# Patient Record
Sex: FEMALE | Race: OTHER | Employment: STUDENT | URBAN - METROPOLITAN AREA
[De-identification: names, ages, dates, MRNs, and addresses within clinical notes are randomized per-mention and may not be internally consistent; named-entity substitution may affect disease eponyms.]

---

## 2017-01-28 ENCOUNTER — TELEPHONE (OUTPATIENT)
Dept: OBGYN CLINIC | Facility: CLINIC | Age: 22
End: 2017-01-28

## 2017-01-28 NOTE — TELEPHONE ENCOUNTER
Pt states that she is taking her birth control pills qd and at the same time every day. Pt has not missed any pills. Pt states that she did not receive a period last month.   Pt states that she is currently on the 2nd week of the real pills and received a

## 2017-01-30 NOTE — TELEPHONE ENCOUNTER
Pt informed of [facility] recs and verbalized understanding. Pt given office fax number ([phone]). Pt stated she does not know the fax number for health services at her college but will call if an order needs to be faxed over to health services.  Pt informed

## 2017-01-30 NOTE — TELEPHONE ENCOUNTER
Pt informed of [facility] recs and verbalized understanding. Pt stated she is away at college right now, she goes to [address] [place] [address].  Message back to LUANNE--how would you like pt to proceed with pregnancy test? Have her go to [place] and have blood preg

## 2017-02-16 ENCOUNTER — TELEPHONE (OUTPATIENT)
Dept: OBGYN CLINIC | Facility: CLINIC | Age: 22
End: 2017-02-16

## 2017-02-16 DIAGNOSIS — Z32.00 PREGNANCY EXAMINATION OR TEST, PREGNANCY UNCONFIRMED: Primary | ICD-10-CM

## 2017-02-16 NOTE — TELEPHONE ENCOUNTER
Pt states she was unable to go to lab in [place] because they would not order a bhcg. Informed pt we will order bhcg and she can go to the lab this weekend when she is in town. Pt is to call Monday, and if negative, we can order new OCP per [facility]'s 1/28 note.  P

## 2017-02-16 NOTE — TELEPHONE ENCOUNTER
PT STATE SHE SPOKE WITH DR STROUD A MONTH AGO / REGARDING CHANGING HER B/C PILLS / PT STATE SHE WILL BE IN TOWN THIS WEEK-END / WANT TO KNOW IF SHE COULD GET IN TO SEE DR STROUD  / PLS ADV

## 2017-02-18 ENCOUNTER — APPOINTMENT (OUTPATIENT)
Dept: LAB | Age: 22
End: 2017-02-18
Attending: OBSTETRICS & GYNECOLOGY
Payer: COMMERCIAL

## 2017-02-18 DIAGNOSIS — Z32.00 PREGNANCY EXAMINATION OR TEST, PREGNANCY UNCONFIRMED: ICD-10-CM

## 2017-02-18 LAB — HCG SERPL QL: NEGATIVE

## 2017-02-18 PROCEDURE — 84703 CHORIONIC GONADOTROPIN ASSAY: CPT

## 2017-02-18 PROCEDURE — 36415 COLL VENOUS BLD VENIPUNCTURE: CPT

## 2017-02-20 RX ORDER — NORETHINDRONE ACETATE AND ETHINYL ESTRADIOL 1.5-30(21)
1 KIT ORAL DAILY
Qty: 1 PACKAGE | Refills: 6 | Status: SHIPPED | OUTPATIENT
Start: 2017-02-20 | End: 2017-06-26

## 2017-02-20 RX ORDER — NORETHINDRONE ACETATE AND ETHINYL ESTRADIOL 1.5-30(21)
1 KIT ORAL DAILY
Qty: 1 PACKAGE | Refills: 6 | Status: SHIPPED | OUTPATIENT
Start: 2017-02-20 | End: 2017-02-20

## 2017-02-20 NOTE — TELEPHONE ENCOUNTER
2-18-16 PREG TEST IS NEGATIVE. RX SENT TO PHARMACY. PT TO RETURN IN [place] FOR ANNUAL. PT NOTIFIED TEST IS NEGATIVE AND TO START OC'S TODAY. PT ASKED THAT RX BE SENT TO HER PHARMACY IN [place]. RX SENT. PT AWARE TO RETURN IN [place].

## 2017-04-05 NOTE — TELEPHONE ENCOUNTER
90 day supply request received from pts [facility] for Junel. Pt due for next annual in 8/2017.  Form completed and faxed back to [facility] for 90 day supply with 1 refill to cover her until august.

## 2017-06-26 ENCOUNTER — TELEPHONE (OUTPATIENT)
Dept: OBGYN CLINIC | Facility: CLINIC | Age: 22
End: 2017-06-26

## 2017-06-26 RX ORDER — NORETHINDRONE ACETATE AND ETHINYL ESTRADIOL 1.5-30(21)
1 KIT ORAL DAILY
Qty: 1 PACKAGE | Refills: 0 | Status: SHIPPED | OUTPATIENT
Start: 2017-06-26 | End: 2017-08-10

## 2017-06-26 NOTE — TELEPHONE ENCOUNTER
Pt is about to get on a plane, pt is giving authorization for the nurse to call mom back [phone].  Explain the ZARINA policy to pt, per pt she needs her rx by today

## 2017-06-26 NOTE — TELEPHONE ENCOUNTER
Per below release spoke with mom. States pt is on her way back from [facility] and lost her BC while away. Requesting a one month supply be sent to local pharmacy. [facility] pt missed one day.   Informed 90 days supply request from pharmacy was approved by our of

## 2017-06-26 NOTE — TELEPHONE ENCOUNTER
PER PT SHE LOST HER B/C NEW PACK DURING VACATION, CAN A NEW RX BE SEND TO [facility] ?

## 2017-08-10 ENCOUNTER — TELEPHONE (OUTPATIENT)
Dept: OBGYN CLINIC | Facility: CLINIC | Age: 22
End: 2017-08-10

## 2017-08-10 RX ORDER — NORETHINDRONE ACETATE AND ETHINYL ESTRADIOL 1.5-30(21)
1 KIT ORAL DAILY
Qty: 3 PACKAGE | Refills: 0 | Status: SHIPPED | OUTPATIENT
Start: 2017-08-10 | End: 2018-03-14

## 2017-08-10 NOTE — TELEPHONE ENCOUNTER
Lm stating 3 month OCP rx refill has been sent to pt's pharmacy to cover pt to next annual appt. Pt to ensure she keeps annual appt to get rx refill. Last annual 8/4/16.

## 2017-08-10 NOTE — TELEPHONE ENCOUNTER
Pt needs a 3mos refill for bc. Pt insurance only cover 3 mos supply. Needs it until px on 9/29.  Pl adv

## 2018-02-27 ENCOUNTER — TELEPHONE (OUTPATIENT)
Dept: OBGYN CLINIC | Facility: CLINIC | Age: 23
End: 2018-02-27

## 2018-02-27 NOTE — TELEPHONE ENCOUNTER
Pt states that she had lost three packages of ocps  and had them refilled by pharmacy. Pt states she the found the packets she lost. She states she used the ocps packets  that she found at home took them when they were needed.   Pt stating that she is due

## 2018-02-27 NOTE — TELEPHONE ENCOUNTER
Matty sent a request for birthcontrol pils for Junel Fe 1.5mg - 30 mcg. Pt has appt scheduled 3/2018. Last Annual 8/4/16 with LUANNE. Pt was due for Annual 8/2017.  (No show 9/29/17.)    Last ocp filled on 8/10/17 for loestrin Fe 1.5/30 for 3

## 2018-03-14 ENCOUNTER — OFFICE VISIT (OUTPATIENT)
Dept: OBGYN CLINIC | Facility: CLINIC | Age: 23
End: 2018-03-14

## 2018-03-14 VITALS
SYSTOLIC BLOOD PRESSURE: 124 MMHG | BODY MASS INDEX: 19.63 KG/M2 | WEIGHT: 115 LBS | HEART RATE: 63 BPM | DIASTOLIC BLOOD PRESSURE: 81 MMHG | HEIGHT: 64 IN

## 2018-03-14 DIAGNOSIS — Z30.09 BIRTH CONTROL COUNSELING: ICD-10-CM

## 2018-03-14 DIAGNOSIS — Z01.419 ENCOUNTER FOR GYNECOLOGICAL EXAMINATION WITHOUT ABNORMAL FINDING: Primary | ICD-10-CM

## 2018-03-14 PROCEDURE — 99395 PREV VISIT EST AGE 18-39: CPT | Performed by: OBSTETRICS & GYNECOLOGY

## 2018-03-14 RX ORDER — NORETHINDRONE ACETATE AND ETHINYL ESTRADIOL 1.5-30(21)
1 KIT ORAL DAILY
Qty: 3 PACKAGE | Refills: 3 | Status: SHIPPED | OUTPATIENT
Start: 2018-03-14 | End: 2019-02-11

## 2018-03-14 NOTE — PROGRESS NOTES
Ernestina Sanchez is a 23year old female  Patient's last menstrual period was 2018. Patient presents with:  Gyn Exam: annual  Contraception: off ocps x 2 months -- wishes to restart -- no issues  .     OBSTETRICS HISTORY:  OB History    P denies chest pain or palpitations  Respiratory:    denies shortness of breath  Gastrointestinal:  denies heartburn, abdominal pain, diarrhea or constipation  Genitourinary:    denies dysuria, incontinence, abnormal vaginal discharge, vaginal itching  Musc gynecological examination without abnormal finding    Birth control counseling    Other orders  -     Norethin Ace-Eth Estrad-FE (LOESTRIN FE 1.5/30) 1.5-30 MG-MCG Oral Tab; Take 1 tablet by mouth daily. Pap next year. Declines STD screen.  ocps refill

## 2018-10-27 ENCOUNTER — NURSE TRIAGE (OUTPATIENT)
Dept: OTHER | Age: 23
End: 2018-10-27

## 2018-10-27 NOTE — TELEPHONE ENCOUNTER
Action Requested: Summary for Provider     []  Critical Lab, Recommendations Needed  [] Need Additional Advice  []   FYI    []   Need Orders  [] Need Medications Sent to Pharmacy  []  Other     SUMMARY: Patient advised to go to nearest Urgent care/ or ER i

## 2018-12-21 ENCOUNTER — NURSE TRIAGE (OUTPATIENT)
Dept: FAMILY MEDICINE CLINIC | Facility: CLINIC | Age: 23
End: 2018-12-21

## 2018-12-21 NOTE — TELEPHONE ENCOUNTER
Action Requested: Summary for Provider     []  Critical Lab, Recommendations Needed  [] Need Additional Advice  []   FYI    []   Need Orders  [] Need Medications Sent to Pharmacy  []  Other     SUMMARY: Follow up appt scheduled for [facility] 12/24

## 2018-12-24 ENCOUNTER — HOSPITAL ENCOUNTER (OUTPATIENT)
Dept: GENERAL RADIOLOGY | Age: 23
Discharge: HOME | End: 2018-12-24
Attending: FAMILY MEDICINE
Payer: COMMERCIAL

## 2018-12-24 ENCOUNTER — OFFICE VISIT (OUTPATIENT)
Dept: FAMILY MEDICINE CLINIC | Facility: CLINIC | Age: 23
End: 2018-12-24
Payer: COMMERCIAL

## 2018-12-24 ENCOUNTER — LAB ENCOUNTER (OUTPATIENT)
Dept: LAB | Age: 23
End: 2018-12-24
Attending: FAMILY MEDICINE
Payer: COMMERCIAL

## 2018-12-24 VITALS
TEMPERATURE: 98 F | BODY MASS INDEX: 20.42 KG/M2 | HEIGHT: 64 IN | HEART RATE: 104 BPM | DIASTOLIC BLOOD PRESSURE: 100 MMHG | WEIGHT: 119.63 LBS | SYSTOLIC BLOOD PRESSURE: 150 MMHG

## 2018-12-24 DIAGNOSIS — Z87.19 HISTORY OF CONSTIPATION: ICD-10-CM

## 2018-12-24 DIAGNOSIS — R10.9 LEFT FLANK PAIN: ICD-10-CM

## 2018-12-24 DIAGNOSIS — R10.9 LEFT FLANK PAIN: Primary | ICD-10-CM

## 2018-12-24 PROCEDURE — 99212 OFFICE O/P EST SF 10 MIN: CPT | Performed by: FAMILY MEDICINE

## 2018-12-24 PROCEDURE — 74018 RADEX ABDOMEN 1 VIEW: CPT | Performed by: FAMILY MEDICINE

## 2018-12-24 PROCEDURE — 99215 OFFICE O/P EST HI 40 MIN: CPT | Performed by: FAMILY MEDICINE

## 2018-12-24 PROCEDURE — 85025 COMPLETE CBC W/AUTO DIFF WBC: CPT

## 2018-12-24 PROCEDURE — 80053 COMPREHEN METABOLIC PANEL: CPT

## 2018-12-24 PROCEDURE — 36415 COLL VENOUS BLD VENIPUNCTURE: CPT

## 2018-12-24 PROCEDURE — 85652 RBC SED RATE AUTOMATED: CPT

## 2018-12-24 PROCEDURE — 81025 URINE PREGNANCY TEST: CPT | Performed by: FAMILY MEDICINE

## 2018-12-24 PROCEDURE — 81001 URINALYSIS AUTO W/SCOPE: CPT

## 2018-12-24 NOTE — PROGRESS NOTES
Patient ID: Tali Colvin is a 26year old female. HPI  Patient presents with:  Back Pain: cramping pain, starts at LLQ abdomen and radiates to back    She is now a barry in college. She states his left flank pain started in October of this year. Gastrointestinal: Negative for abdominal distention, abdominal pain, constipation, diarrhea and vomiting. Genitourinary: Positive for flank pain. Negative for difficulty urinating, dysuria and hematuria.          Past Medical History:   Diagnosis Date and all orders for this visit:    Left flank pain  -     URINE PREGNANCY TEST  -     Cancel: URINALYSIS, AUTO, W/O SCOPE  -     XR ABDOMEN (1 VIEW) (CPT=74018); Future  -     CBC WITH DIFFERENTIAL WITH PLATELET;  Future  -     COMP METABOLIC PANEL (14); Fut agrees to plan. No Follow-up on file.       Ana Lazcano,   12/24/2018

## 2018-12-24 NOTE — PATIENT INSTRUCTIONS
Go ahead and check the blood pressures at home with the cuff. If continues to be high please let us know. You may have whitecoat hypertension.

## 2018-12-26 ENCOUNTER — TELEPHONE (OUTPATIENT)
Dept: FAMILY MEDICINE CLINIC | Facility: CLINIC | Age: 23
End: 2018-12-26

## 2018-12-26 NOTE — TELEPHONE ENCOUNTER
Advised patient on Dr. Lopez Flaherty information and recommendations. Patient verbalized understanding.       Notes recorded by Dragan Martinez DO on 12/25/2018 at 10:07 AM CST  CBC shows no anemia or leukemia. Murl Harm test liver tests are normal.  Sedimentation

## 2018-12-27 ENCOUNTER — OFFICE VISIT (OUTPATIENT)
Dept: FAMILY MEDICINE CLINIC | Facility: CLINIC | Age: 23
End: 2018-12-27
Payer: COMMERCIAL

## 2018-12-27 VITALS
SYSTOLIC BLOOD PRESSURE: 132 MMHG | DIASTOLIC BLOOD PRESSURE: 90 MMHG | HEART RATE: 88 BPM | HEIGHT: 64 IN | BODY MASS INDEX: 19.97 KG/M2 | WEIGHT: 117 LBS

## 2018-12-27 DIAGNOSIS — R10.9 LEFT FLANK PAIN, CHRONIC: Primary | ICD-10-CM

## 2018-12-27 DIAGNOSIS — G89.29 LEFT FLANK PAIN, CHRONIC: Primary | ICD-10-CM

## 2018-12-27 PROCEDURE — 99212 OFFICE O/P EST SF 10 MIN: CPT | Performed by: FAMILY MEDICINE

## 2018-12-27 PROCEDURE — 99214 OFFICE O/P EST MOD 30 MIN: CPT | Performed by: FAMILY MEDICINE

## 2018-12-27 RX ORDER — CIPROFLOXACIN 500 MG/1
500 TABLET, FILM COATED ORAL 2 TIMES DAILY
Qty: 10 TABLET | Refills: 0 | Status: SHIPPED | OUTPATIENT
Start: 2018-12-27 | End: 2019-01-01

## 2018-12-27 RX ORDER — NABUMETONE 750 MG/1
750 TABLET, FILM COATED ORAL 2 TIMES DAILY
Qty: 60 TABLET | Refills: 0 | Status: SHIPPED | OUTPATIENT
Start: 2018-12-27 | End: 2019-09-23

## 2018-12-27 NOTE — PROGRESS NOTES
Patient ID: Woody Miner is a 26year old female. HPI  Patient presents with: Follow - Up: left flank pain,patient states no better ,pain is unchanged   Test Results: discuss     I saw her on 12/24/2018. She is here now with her father.   He is fredy calcification in the projection of the kidneys or along the expected course of the ureters or bladder. 2. Nonspecific bowel gas pattern without evidence of obstruction     She states she came back today because we need to find out what is going on.       Jarod Santana respiratory distress. Abdominal: Normal appearance and bowel sounds are normal. There is some tenderness in the left flank when I palpate deep in the left upper quadrant but I do not feel a mass. She has no costovertebral tenderness.   I do not feel any time.  I did let dad know that if the pain gets terrible and unrelenting then perhaps she would need to go to the emergency room. Referrals (if applicable)  No orders of the defined types were placed in this encounter.         Follow up if symptoms persi

## 2018-12-28 ENCOUNTER — TELEPHONE (OUTPATIENT)
Dept: FAMILY MEDICINE CLINIC | Facility: CLINIC | Age: 23
End: 2018-12-28

## 2018-12-28 ENCOUNTER — HOSPITAL ENCOUNTER (OUTPATIENT)
Dept: CT IMAGING | Age: 23
Discharge: HOME | End: 2018-12-28
Attending: FAMILY MEDICINE
Payer: COMMERCIAL

## 2018-12-28 DIAGNOSIS — R10.9 LEFT FLANK PAIN, CHRONIC: ICD-10-CM

## 2018-12-28 DIAGNOSIS — G89.29 LEFT FLANK PAIN, CHRONIC: ICD-10-CM

## 2018-12-28 PROCEDURE — 74176 CT ABD & PELVIS W/O CONTRAST: CPT | Performed by: FAMILY MEDICINE

## 2018-12-29 NOTE — PROGRESS NOTES
Spoke with patient and advised Dr Fazal Yip note and verbalized understanding. Dr Ybarra's office  Number given for GI referral.    Notes recorded by Milvia Lanier DO on 12/28/2018 at 12:05 PM CST  Kidney stone CAT scan showed a very tiny nonobstructing

## 2019-02-11 RX ORDER — NORETHINDRONE ACETATE AND ETHINYL ESTRADIOL AND FERROUS FUMARATE 1.5-30(21)
KIT ORAL
Qty: 84 TABLET | Refills: 0 | Status: SHIPPED | OUTPATIENT
Start: 2019-02-11 | End: 2019-09-23

## 2019-02-11 NOTE — TELEPHONE ENCOUNTER
LAST ANNUAL 3-14-18.   PT NOTIFIED AUTHORIZATION FOR #84 SENT TO [facility]

## 2019-07-24 ENCOUNTER — TELEPHONE (OUTPATIENT)
Dept: OBGYN CLINIC | Facility: CLINIC | Age: 24
End: 2019-07-24

## 2019-07-24 NOTE — TELEPHONE ENCOUNTER
Pt had to reschedule her annual appt from today, states she's leaving for school on 8/17 and wants to see [facility] but no openings. Rescheduled pt with [facility] but pt insisting on asking [facility] to squeeze her in.  Please advise

## 2019-08-14 ENCOUNTER — TELEPHONE (OUTPATIENT)
Dept: OBGYN CLINIC | Facility: CLINIC | Age: 24
End: 2019-08-14

## 2019-08-14 NOTE — TELEPHONE ENCOUNTER
Pt requesting OCP refill. Informed pt message needs to be sent to [facility] for approval. Informed pt [facility] is back in office 8/15/19. Pt verbalized understanding. Pt canceled annual appts for 3/15/19, 7/24/19 and 8/8/19.   Pt no showed annual appts 4/12/19 and

## 2019-08-14 NOTE — TELEPHONE ENCOUNTER
Pt scheduled annual for 9/23, asking for a refill until her appt, states out of her [facility].  Please advise

## 2019-08-15 NOTE — TELEPHONE ENCOUNTER
ADVISED SHE NEEDS TO BE SEEN IN ORDER TO GET RX. PT SAID SHE IS GOING AWAY TO SCHOOL AND WANTS TO KNOW IF SHE WILL HAVE TO BE OFF OC'S SINCE [facility] WILL NOT REFILL?   AGAIN ADVISED NO REFILLS WITHOUT BEING SEEN AND SUGGESTED SHE CHECK WITH THE HEALTH CENTER/D

## 2019-09-23 ENCOUNTER — OFFICE VISIT (OUTPATIENT)
Dept: OBGYN CLINIC | Facility: CLINIC | Age: 24
End: 2019-09-23
Payer: COMMERCIAL

## 2019-09-23 VITALS
DIASTOLIC BLOOD PRESSURE: 87 MMHG | HEART RATE: 80 BPM | SYSTOLIC BLOOD PRESSURE: 130 MMHG | HEIGHT: 63.7 IN | BODY MASS INDEX: 21.78 KG/M2 | WEIGHT: 126 LBS

## 2019-09-23 DIAGNOSIS — Z30.09 BIRTH CONTROL COUNSELING: ICD-10-CM

## 2019-09-23 DIAGNOSIS — Z01.419 ENCOUNTER FOR GYNECOLOGICAL EXAMINATION WITHOUT ABNORMAL FINDING: Primary | ICD-10-CM

## 2019-09-23 PROCEDURE — 99395 PREV VISIT EST AGE 18-39: CPT | Performed by: OBSTETRICS & GYNECOLOGY

## 2019-09-23 RX ORDER — NORETHINDRONE ACETATE AND ETHINYL ESTRADIOL 1.5-30(21)
1 KIT ORAL
Qty: 84 TABLET | Refills: 1 | Status: SHIPPED | OUTPATIENT
Start: 2019-09-23 | End: 2021-04-26

## 2019-09-23 NOTE — PROGRESS NOTES
Ankit Fernandes is a 26year old female [facility] Patient's last menstrual period was 08/28/2019. Patient presents with:  Gyn Exam: ANNUAL EXAM  Contraception: ran out of ocps since overdue for exam -- wishes to restart  .     OBSTETRICS HISTORY:  O clubs or organizations: Not on file        Relationship status: Not on file      Intimate partner violence:        Fear of current or ex partner: Not on file        Emotionally abused: Not on file        Physically abused: Not on file        Forced sexual changes  Abdomen:   soft, nontender, nondistended, no masses  Skin/Hair:  no unusual rashes or bruises  Extremities:  no edema, no cyanosis  Psychiatric:   oriented to time, place, person and situation.  Appropriate mood and affect    Pelvic Exam:  External

## 2019-09-24 LAB
C TRACH DNA SPEC QL NAA+PROBE: NEGATIVE
N GONORRHOEA DNA SPEC QL NAA+PROBE: NEGATIVE

## 2019-09-25 LAB — T VAGINALIS RRNA SPEC QL NAA+PROBE: NEGATIVE

## 2019-09-26 LAB — HPV I/H RISK 1 DNA SPEC QL NAA+PROBE: POSITIVE

## 2019-09-30 ENCOUNTER — TELEPHONE (OUTPATIENT)
Dept: OBGYN CLINIC | Facility: CLINIC | Age: 24
End: 2019-09-30

## 2019-09-30 NOTE — TELEPHONE ENCOUNTER
----- Message from Ortiz Ng MD sent at 9/30/2019  3:54 PM CDT -----  Please call pt and inform her of results attached -- given only 23, needs pap again in one year.

## 2019-09-30 NOTE — TELEPHONE ENCOUNTER
Pt informed of pap and culture results, and recs for pap in one year. All questions answered about the pap and hpv results. Pt expressed understanding.

## 2019-10-07 ENCOUNTER — TELEPHONE (OUTPATIENT)
Dept: OBGYN CLINIC | Facility: CLINIC | Age: 24
End: 2019-10-07

## 2019-10-07 NOTE — TELEPHONE ENCOUNTER
Pt states she had pap done on 9/30. States she now has to urinate more frequently. Requesting to speak with nurse. Please advise.

## 2019-10-07 NOTE — TELEPHONE ENCOUNTER
Pt states that she has symptoms of an UTI for urgency and frequency with urination. Pt has lower back pain pain. Denies painful urination and burning with urination. Pt goes to [place] for school. She will go to [employer] for an ua. Pt wanted it noted she going for an UA at school and inform [employer]. Sent to [employer] as a FYI.

## 2020-01-09 ENCOUNTER — TELEPHONE (OUTPATIENT)
Dept: OBGYN CLINIC | Facility: CLINIC | Age: 25
End: 2020-01-09

## 2020-01-09 NOTE — TELEPHONE ENCOUNTER
Pt would like to talk to [address] [place] [address] about no show charge 7/30, did adv pt also had a no show 04/12/19

## 2020-01-09 NOTE — TELEPHONE ENCOUNTER
Called pt in regards to message below. Pt stated she goes to school in [place] and states [facility] knows this and pt had an emergency in July and had to cancel her appt. Pt stated she called on 7/24 to cancel appt that day and is getting charged a $40 no show fee.

## 2020-03-25 RX ORDER — NORETHINDRONE ACETATE AND ETHINYL ESTRADIOL AND FERROUS FUMARATE 1.5-30(21)
KIT ORAL
Qty: 84 TABLET | Refills: 0 | OUTPATIENT
Start: 2020-03-25

## 2020-03-25 NOTE — TELEPHONE ENCOUNTER
PT WAS SEEN 9-23-19 AND WAS ADVISED TO RETURN IN 3-4 MONTHS FOR BP CHECK THEN COULD REFILL. PT DID NOT RETURN FOR BP CHECK. SENT BACK RX DENIED, NEEDS APPT.

## 2020-07-07 ENCOUNTER — TELEPHONE (OUTPATIENT)
Dept: OBGYN CLINIC | Facility: CLINIC | Age: 25
End: 2020-07-07

## 2020-07-07 NOTE — TELEPHONE ENCOUNTER
Mom originally called and states pt has had irregular cycles and bloating, informed mom no ZARINA and states pt can be reached at [phone]

## 2020-07-09 ENCOUNTER — APPOINTMENT (OUTPATIENT)
Dept: LAB | Age: 25
End: 2020-07-09
Attending: OBSTETRICS & GYNECOLOGY
Payer: COMMERCIAL

## 2020-07-09 ENCOUNTER — OFFICE VISIT (OUTPATIENT)
Dept: OBGYN CLINIC | Facility: CLINIC | Age: 25
End: 2020-07-09
Payer: COMMERCIAL

## 2020-07-09 VITALS
WEIGHT: 124 LBS | BODY MASS INDEX: 21 KG/M2 | DIASTOLIC BLOOD PRESSURE: 75 MMHG | HEART RATE: 70 BPM | SYSTOLIC BLOOD PRESSURE: 113 MMHG

## 2020-07-09 DIAGNOSIS — N92.6 MISSED PERIOD: Primary | ICD-10-CM

## 2020-07-09 DIAGNOSIS — N92.6 MISSED PERIOD: ICD-10-CM

## 2020-07-09 DIAGNOSIS — R14.0 BLOATED ABDOMEN: ICD-10-CM

## 2020-07-09 DIAGNOSIS — R10.9 RIGHT SIDED ABDOMINAL PAIN: ICD-10-CM

## 2020-07-09 LAB — HCG SERPL QL: NEGATIVE

## 2020-07-09 PROCEDURE — 84703 CHORIONIC GONADOTROPIN ASSAY: CPT

## 2020-07-09 PROCEDURE — 99213 OFFICE O/P EST LOW 20 MIN: CPT | Performed by: OBSTETRICS & GYNECOLOGY

## 2020-07-09 PROCEDURE — 36415 COLL VENOUS BLD VENIPUNCTURE: CPT

## 2020-07-09 NOTE — PROGRESS NOTES
Samir Marr is a 27year old female  Patient's last menstrual period was 2020.  Patient presents with:  Gyn Problem: PELVIC PAIN -- xs bloated x 3 weeks, never skipped periods before (occas one week late), (+) sexually active, com service: Not on file        Active member of club or organization: Not on file        Attends meetings of clubs or organizations: Not on file        Relationship status: Not on file      Intimate partner violence:        Fear of current or ex partner: Not lesions and prolapse  Bladder:    no fullness, masses or tenderness  Vagina:    normal appearance without lesions, no abnormal discharge  Cervix:    normal without tenderness on motion  Uterus:   normal in size, contour, position, mobility, without tendern

## 2020-09-02 ENCOUNTER — TELEPHONE (OUTPATIENT)
Dept: FAMILY MEDICINE CLINIC | Facility: CLINIC | Age: 25
End: 2020-09-02

## 2020-09-02 ENCOUNTER — TELEPHONE (OUTPATIENT)
Dept: SCHEDULING | Age: 25
End: 2020-09-02

## 2020-09-02 ENCOUNTER — WALK IN (OUTPATIENT)
Dept: URGENT CARE | Age: 25
End: 2020-09-02

## 2020-09-02 DIAGNOSIS — Z02.1 PHYSICAL EXAM, PRE-EMPLOYMENT: Primary | ICD-10-CM

## 2020-09-02 PROCEDURE — X0945 SELF PAY APN OR PA PERFORMED ADMINISTRATIVE PHYSICAL: HCPCS | Performed by: NURSE PRACTITIONER

## 2020-09-02 RX ORDER — NORETHINDRONE ACETATE AND ETHINYL ESTRADIOL 1; .02 MG/1; MG/1
1 TABLET ORAL DAILY
COMMUNITY
End: 2022-07-26 | Stop reason: ALTCHOICE

## 2020-09-02 SDOH — HEALTH STABILITY: MENTAL HEALTH: HOW OFTEN DO YOU HAVE A DRINK CONTAINING ALCOHOL?: 2-4 TIMES A MONTH

## 2020-09-02 ASSESSMENT — PAIN SCALES - GENERAL: PAINLEVEL: 0

## 2020-09-02 ASSESSMENT — ENCOUNTER SYMPTOMS
RESPIRATORY NEGATIVE: 1
GASTROINTESTINAL NEGATIVE: 1
NEUROLOGICAL NEGATIVE: 1
PSYCHIATRIC NEGATIVE: 1
EYES NEGATIVE: 1
CONSTITUTIONAL NEGATIVE: 1

## 2020-09-02 NOTE — TELEPHONE ENCOUNTER
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     Please reply to pool: ANTONI Melchor

## 2020-10-05 ENCOUNTER — NURSE TRIAGE (OUTPATIENT)
Dept: FAMILY MEDICINE CLINIC | Facility: CLINIC | Age: 25
End: 2020-10-05

## 2020-10-05 NOTE — TELEPHONE ENCOUNTER
Please reply to pool: EM RN TRIAGE    Action Requested: Summary for Provider     []  Critical Lab, Recommendations Needed  [] Need Additional Advice  []   FYI    []   Need Orders  [] Need Medications Sent to Pharmacy  []  Other     SUMMARY:Offered Doximi

## 2021-04-26 ENCOUNTER — LAB ENCOUNTER (OUTPATIENT)
Dept: LAB | Facility: HOSPITAL | Age: 26
End: 2021-04-26
Attending: OBSTETRICS & GYNECOLOGY
Payer: COMMERCIAL

## 2021-04-26 ENCOUNTER — OFFICE VISIT (OUTPATIENT)
Dept: OBGYN CLINIC | Facility: CLINIC | Age: 26
End: 2021-04-26
Payer: COMMERCIAL

## 2021-04-26 VITALS
SYSTOLIC BLOOD PRESSURE: 128 MMHG | DIASTOLIC BLOOD PRESSURE: 88 MMHG | HEART RATE: 69 BPM | BODY MASS INDEX: 20 KG/M2 | WEIGHT: 117 LBS

## 2021-04-26 DIAGNOSIS — Z11.3 SCREEN FOR STD (SEXUALLY TRANSMITTED DISEASE): ICD-10-CM

## 2021-04-26 DIAGNOSIS — Z12.4 SCREENING FOR MALIGNANT NEOPLASM OF CERVIX: ICD-10-CM

## 2021-04-26 DIAGNOSIS — Z01.411 ENCOUNTER FOR GYNECOLOGICAL EXAMINATION WITH ABNORMAL FINDING: Primary | ICD-10-CM

## 2021-04-26 DIAGNOSIS — N85.2 ENLARGED UTERUS: ICD-10-CM

## 2021-04-26 DIAGNOSIS — Z30.09 BIRTH CONTROL COUNSELING: ICD-10-CM

## 2021-04-26 PROCEDURE — 87340 HEPATITIS B SURFACE AG IA: CPT

## 2021-04-26 PROCEDURE — 86780 TREPONEMA PALLIDUM: CPT

## 2021-04-26 PROCEDURE — 86803 HEPATITIS C AB TEST: CPT

## 2021-04-26 PROCEDURE — 3079F DIAST BP 80-89 MM HG: CPT | Performed by: OBSTETRICS & GYNECOLOGY

## 2021-04-26 PROCEDURE — 3074F SYST BP LT 130 MM HG: CPT | Performed by: OBSTETRICS & GYNECOLOGY

## 2021-04-26 PROCEDURE — 87389 HIV-1 AG W/HIV-1&-2 AB AG IA: CPT

## 2021-04-26 PROCEDURE — 36415 COLL VENOUS BLD VENIPUNCTURE: CPT

## 2021-04-26 PROCEDURE — 99395 PREV VISIT EST AGE 18-39: CPT | Performed by: OBSTETRICS & GYNECOLOGY

## 2021-04-26 RX ORDER — DROSPIRENONE AND ETHINYL ESTRADIOL 0.02-3(28)
1 KIT ORAL DAILY
Qty: 3 PACKAGE | Refills: 1 | Status: SHIPPED | OUTPATIENT
Start: 2021-04-26 | End: 2021-09-11

## 2021-04-26 RX ORDER — DROSPIRENONE AND ETHINYL ESTRADIOL 0.02-3(28)
1 KIT ORAL DAILY
Qty: 3 PACKAGE | Refills: 3 | Status: SHIPPED | OUTPATIENT
Start: 2021-04-26 | End: 2021-04-26

## 2021-04-26 NOTE — PROGRESS NOTES
Tulio Saldaña is a 23year old female [facility] Patient's last menstrual period was 04/22/2021.  Patient presents with:  Gyn Exam: annual  Medication Request: ocp refill -- has been off x 5 months -- was having xs acne, eating issues & not consist headaches  Psychiatric:   denies depression or anxiety, thoughts of harming self or others  Heme/Lymph:    denies easy bruising or bleeding      PHYSICAL EXAM:   Blood pressure 128/88, pulse 69, weight 117 lb (53.1 kg), last menstrual period 04/22/2021, no CHLAMYDIA/GONOCOCCUS, PALMA    Screening for malignant neoplasm of cervix  -     THINPREP PAP WITH HPV REFLEX REQUEST B; Future  -     CHLAMYDIA/GONOCOCCUS, PALMA; Future  -     TRICH VAG BY PALMA;  Future  -     TRICH VAG BY PALMA  -     CHLAMYDIA/GONOCOCCUS, PALMA

## 2021-04-29 ENCOUNTER — TELEPHONE (OUTPATIENT)
Dept: OBGYN CLINIC | Facility: CLINIC | Age: 26
End: 2021-04-29

## 2021-04-29 NOTE — TELEPHONE ENCOUNTER
Pt calling for lab results. Informed pt the Pap smear and Trich are still in process and can take a few days to result.  Informed pt the STD screening is negative and normal. Informed pt once the Pap is resulted then [facility] will provide result and nurse will c

## 2021-05-03 NOTE — TELEPHONE ENCOUNTER
Patient saw a notification that her results are abnormal and would like to discuss them with someone as soon as possible. Please call.

## 2021-05-03 NOTE — TELEPHONE ENCOUNTER
Pt called and informed that [facility] has not reviewed her test results at this time, but will reach out to her as soon as we receive further recs. Pt placed mother on speaker phone, states pt is \"freaking out\".  Let mother know that we will call her back once

## 2021-05-07 ENCOUNTER — HOSPITAL ENCOUNTER (OUTPATIENT)
Dept: ULTRASOUND IMAGING | Age: 26
Discharge: HOME | End: 2021-05-07
Attending: OBSTETRICS & GYNECOLOGY
Payer: COMMERCIAL

## 2021-05-07 DIAGNOSIS — N85.2 ENLARGED UTERUS: ICD-10-CM

## 2021-05-07 PROCEDURE — 76856 US EXAM PELVIC COMPLETE: CPT | Performed by: OBSTETRICS & GYNECOLOGY

## 2021-05-26 VITALS
BODY MASS INDEX: 21.45 KG/M2 | WEIGHT: 125.66 LBS | DIASTOLIC BLOOD PRESSURE: 80 MMHG | HEART RATE: 65 BPM | RESPIRATION RATE: 16 BRPM | HEIGHT: 64 IN | TEMPERATURE: 98.3 F | SYSTOLIC BLOOD PRESSURE: 110 MMHG

## 2021-09-03 ENCOUNTER — TELEPHONE (OUTPATIENT)
Dept: OBGYN CLINIC | Facility: CLINIC | Age: 26
End: 2021-09-03

## 2021-09-03 NOTE — TELEPHONE ENCOUNTER
Pt was seen in office on 4/26/2021 with NJ, she states she had BTB with the first two packs of Nena OCP, which subside with pack 3. Pt states she then decided to skip the placebo pills on pack 4 and started another active pack. Pt states she had BTB again, contacted office on 8/17 regarding it. [facility] recs were for pt to stop OCP for 4 days and then restart new pack. Pt states she restarted new pack on 8/21, is currently on 2nd week of active pills and notes light flow, changing pads every 4 hours. Pt states no clots or cramps. Pt states she notices she is more emotional with this OCP, states she jazlyn easily, does not have thoughts of harming herself or others. Pt states she is taking the pill at the same time every day and has not missed or skipped any pills, pt denies any recent unprotected IC. Pt informed that BTB will be expected with break in OCP and restart. Pt informed that body is attempting to readjust to changes in hormones. Pt instructed to make sure take pills at the same time everyday, do not skip or double up. Pt given bleeding and pain precautions. Pt verbalizes understanding. Pt asking if LUANNE thinks she should switch OCP's? Pt informed that message will be sent to [address] [facility] for recs. Pt scheduled for BP check this Tuesday. To [facility] to please review and advise. Thank you.

## 2021-09-03 NOTE — TELEPHONE ENCOUNTER
Pt on BC having periods every 2 weeks.  Pt called about 2 weeks ago nurse told her to stop taking pill about 4 days, it helped but period started back

## 2021-09-07 NOTE — TELEPHONE ENCOUNTER
BP check needs to be during active pills. If feels barbour, agree with changing pill.  What has she tried prior & what issues on them so I can avoid those pills

## 2021-09-07 NOTE — TELEPHONE ENCOUNTER
Pt states she will be on placebo pills for that appt. Appt rescheduled for 9/21/21. Pt states she was on Junel in the past and it worked well. Chart shows pt was most recently on Junel Fe 1.5/30. Pt states she was also on the generic form of that and she had no issues with it. Pt informed message will be sent to [address] [place] [address] for recs. Pharmacy verified.

## 2021-09-07 NOTE — TELEPHONE ENCOUNTER
Needs to be on active pills for BP check so adjust that appt.  Can call in junel 1.5 / 30 until annual due if BP normal on active pills of ocps

## 2021-09-07 NOTE — TELEPHONE ENCOUNTER
Pt called with LUANNE recs, pt states she is on active pills now requesting to come in for BP sooner since if OCPs will change she would like to start them after her next period which is due next week. Pt states she can come in on 9/9 at 10am. Pt states she will still be on active pills that day. Appt booked.

## 2021-09-09 ENCOUNTER — NURSE ONLY (OUTPATIENT)
Dept: OBGYN CLINIC | Facility: CLINIC | Age: 26
End: 2021-09-09
Payer: COMMERCIAL

## 2021-09-09 VITALS
SYSTOLIC BLOOD PRESSURE: 127 MMHG | BODY MASS INDEX: 20 KG/M2 | WEIGHT: 113 LBS | HEART RATE: 76 BPM | DIASTOLIC BLOOD PRESSURE: 88 MMHG

## 2021-09-09 DIAGNOSIS — Z01.30 BLOOD PRESSURE CHECK: Primary | ICD-10-CM

## 2021-09-09 PROCEDURE — 3079F DIAST BP 80-89 MM HG: CPT | Performed by: OBSTETRICS & GYNECOLOGY

## 2021-09-09 PROCEDURE — 3074F SYST BP LT 130 MM HG: CPT | Performed by: OBSTETRICS & GYNECOLOGY

## 2021-09-09 NOTE — PROGRESS NOTES
PT here today for B/P check due to wanting to change her OCP. Pt took last active pill 9-9-21. Pt B/P today was 127/88. Pt pharmacy was conformed.

## 2021-09-11 RX ORDER — DROSPIRENONE AND ETHINYL ESTRADIOL 0.02-3(28)
1 KIT ORAL DAILY
Qty: 84 TABLET | Refills: 2 | Status: SHIPPED | OUTPATIENT
Start: 2021-09-11 | End: 2021-09-15

## 2021-09-15 ENCOUNTER — PATIENT MESSAGE (OUTPATIENT)
Dept: OBGYN CLINIC | Facility: CLINIC | Age: 26
End: 2021-09-15

## 2021-09-15 ENCOUNTER — TELEPHONE (OUTPATIENT)
Dept: OBGYN CLINIC | Facility: CLINIC | Age: 26
End: 2021-09-15

## 2021-09-15 RX ORDER — NORETHINDRONE ACETATE AND ETHINYL ESTRADIOL 1.5-30(21)
1 KIT ORAL DAILY
Qty: 84 TABLET | Refills: 2 | Status: SHIPPED | OUTPATIENT
Start: 2021-09-15

## 2021-09-15 NOTE — TELEPHONE ENCOUNTER
From: Ankit Fernandes  To: Apolinar Mccall MD  Sent: 9/15/2021 6:51 AM CDT  Subject: Visit Follow-up Question    Was my new birth control sent over yet?  I came in last Thursday to get my blood pressure checked to be sure I can start my new birth

## 2021-09-15 NOTE — TELEPHONE ENCOUNTER
From: Samir Marr  To: Navin Crowell MD  Sent: 9/15/2021 12:50 PM CDT  Subject: Visit Follow-up Question    Is this a different birth control than what I was taking?  I was told I was going to be out on a different one because of my break th

## 2021-09-15 NOTE — TELEPHONE ENCOUNTER
Patient's mom is calling on her behalf regarding refilling her birth control. ([employer] messages have been sent earlier today)  Patient claims she has signed an ZARINA that we can speak with her mother.       Patient is frustrated that her birth control hasn't

## 2021-09-15 NOTE — TELEPHONE ENCOUNTER
Per [facility] note, 9/3/21 telephone:  Note  Needs to be on active pills for BP check so adjust that appt.  Can call in junel 1.5 / 30 until annual due if BP normal on active pills of ocps        Patient with normal blood pressure 9/11/21, so will proceed with OC

## 2022-03-15 ENCOUNTER — PATIENT MESSAGE (OUTPATIENT)
Dept: FAMILY MEDICINE CLINIC | Facility: CLINIC | Age: 27
End: 2022-03-15

## 2022-03-15 NOTE — TELEPHONE ENCOUNTER
From: Herve Sánchez  To: Tru Bkaer DO  Sent: 3/15/2022 10:14 AM CDT  Subject: Non-Urgent Medical Question    Hi. I was just previously sick and it seems like after I get over a fever I am sometimes getting cold sores. Is there any medication I can take or be prescribed when I get these? Abreva doesn't seem to work.

## 2022-03-17 RX ORDER — VALACYCLOVIR HYDROCHLORIDE 1 G/1
1000 TABLET, FILM COATED ORAL EVERY 12 HOURS SCHEDULED
Qty: 8 TABLET | Refills: 3 | Status: SHIPPED | OUTPATIENT
Start: 2022-03-17 | End: 2022-03-18

## 2022-07-26 ENCOUNTER — WALK IN (OUTPATIENT)
Dept: URGENT CARE | Age: 27
End: 2022-07-26

## 2022-07-26 VITALS
TEMPERATURE: 99.4 F | RESPIRATION RATE: 16 BRPM | HEART RATE: 72 BPM | BODY MASS INDEX: 20.38 KG/M2 | OXYGEN SATURATION: 100 % | HEIGHT: 63 IN | SYSTOLIC BLOOD PRESSURE: 130 MMHG | WEIGHT: 115 LBS | DIASTOLIC BLOOD PRESSURE: 78 MMHG

## 2022-07-26 DIAGNOSIS — Z02.1 PHYSICAL EXAM, PRE-EMPLOYMENT: Primary | ICD-10-CM

## 2022-07-26 PROCEDURE — X0943 AMG SELF PAY VISIT: HCPCS | Performed by: NURSE PRACTITIONER

## 2022-07-26 RX ORDER — NORETHINDRONE ACETATE AND ETHINYL ESTRADIOL AND FERROUS FUMARATE 1.5-30(21)
1 KIT ORAL DAILY
COMMUNITY
Start: 2022-06-08

## 2022-07-26 ASSESSMENT — ENCOUNTER SYMPTOMS
PSYCHIATRIC NEGATIVE: 1
HEMATOLOGIC/LYMPHATIC NEGATIVE: 1
CONSTITUTIONAL NEGATIVE: 1
NEUROLOGICAL NEGATIVE: 1
GASTROINTESTINAL NEGATIVE: 1
EYES NEGATIVE: 1
ALLERGIC/IMMUNOLOGIC NEGATIVE: 1
RESPIRATORY NEGATIVE: 1
ENDOCRINE NEGATIVE: 1

## 2022-07-26 ASSESSMENT — VISUAL ACUITY
OS_CC: 20/20
OD_CC: 20/20

## 2022-08-22 RX ORDER — NORETHINDRONE ACETATE AND ETHINYL ESTRADIOL AND FERROUS FUMARATE 1.5-30(21)
KIT ORAL
Qty: 84 TABLET | Refills: 2 | OUTPATIENT
Start: 2022-08-22

## 2022-08-23 RX ORDER — NORETHINDRONE ACETATE AND ETHINYL ESTRADIOL 1.5-30(21)
1 KIT ORAL DAILY
Qty: 84 TABLET | Refills: 0 | Status: SHIPPED | OUTPATIENT
Start: 2022-08-23 | End: 2022-11-18

## 2022-08-23 NOTE — TELEPHONE ENCOUNTER
Pt last annual was 4/26/2021 w/LUANNE  PAP: UTD  Next annual scheduled with [facility] on 9/9/2022.  OCP refill sent to cover pt until annual per protocol

## 2022-11-18 ENCOUNTER — OFFICE VISIT (OUTPATIENT)
Dept: OBGYN CLINIC | Facility: CLINIC | Age: 27
End: 2022-11-18
Payer: COMMERCIAL

## 2022-11-18 VITALS
WEIGHT: 119 LBS | SYSTOLIC BLOOD PRESSURE: 149 MMHG | HEART RATE: 80 BPM | BODY MASS INDEX: 20.32 KG/M2 | DIASTOLIC BLOOD PRESSURE: 88 MMHG | HEIGHT: 64 IN

## 2022-11-18 DIAGNOSIS — Z01.419 WELL WOMAN EXAM WITH ROUTINE GYNECOLOGICAL EXAM: Primary | ICD-10-CM

## 2022-11-18 DIAGNOSIS — N94.9 ADNEXAL FULLNESS: ICD-10-CM

## 2022-11-18 DIAGNOSIS — Z30.41 ORAL CONTRACEPTIVE PILL SURVEILLANCE: ICD-10-CM

## 2022-11-18 DIAGNOSIS — R14.0 BLOATING: ICD-10-CM

## 2022-11-18 DIAGNOSIS — Z12.4 SCREENING FOR MALIGNANT NEOPLASM OF CERVIX: ICD-10-CM

## 2022-11-18 PROCEDURE — 3079F DIAST BP 80-89 MM HG: CPT | Performed by: NURSE PRACTITIONER

## 2022-11-18 PROCEDURE — 3077F SYST BP >= 140 MM HG: CPT | Performed by: NURSE PRACTITIONER

## 2022-11-18 PROCEDURE — 99395 PREV VISIT EST AGE 18-39: CPT | Performed by: NURSE PRACTITIONER

## 2022-11-18 PROCEDURE — 3008F BODY MASS INDEX DOCD: CPT | Performed by: NURSE PRACTITIONER

## 2022-11-18 RX ORDER — NORETHINDRONE ACETATE AND ETHINYL ESTRADIOL 1.5-30(21)
1 KIT ORAL DAILY
Qty: 84 TABLET | Refills: 3 | Status: SHIPPED | OUTPATIENT
Start: 2022-11-18

## 2022-11-18 RX ORDER — NORETHINDRONE ACETATE AND ETHINYL ESTRADIOL 1.5-30(21)
1 KIT ORAL DAILY
Qty: 84 TABLET | Refills: 0 | Status: SHIPPED | OUTPATIENT
Start: 2022-11-18 | End: 2022-11-18

## 2022-11-29 ENCOUNTER — PATIENT MESSAGE (OUTPATIENT)
Dept: OBGYN CLINIC | Facility: CLINIC | Age: 27
End: 2022-11-29

## 2022-11-29 ENCOUNTER — TELEPHONE (OUTPATIENT)
Dept: OBGYN CLINIC | Facility: CLINIC | Age: 27
End: 2022-11-29

## 2022-11-29 NOTE — TELEPHONE ENCOUNTER
Pt called, reaching out for recs on her Pap smear. Pt informed that [facility] would need to review and make recommendations. Pt informed there is a change from testing last year. Pt informed that office will reach out as soon as E[facility] responses, pt states understanding.

## 2022-11-29 NOTE — TELEPHONE ENCOUNTER
From: Salma Smith  To: JANEL Anna  Sent: 11/29/2022 3:57 PM CST  Subject: Visit Follow-up Question    I have a question about THINPREP PAP WITH HPV REFLEX REQUEST resulted on 11/29/22, 3:54 PM.      Can someone give me a call regarding my results.  Thank you

## 2022-11-29 NOTE — TELEPHONE ENCOUNTER
Pt needing clarification on pap results, results were posted in [facility] [address].  Please advise

## 2022-11-30 NOTE — TELEPHONE ENCOUNTER
Pt informed of results and recs and verbalized understanding. Pt will review her schedule and call back to book colpo. Staff message sent to f/u if pt scheduled appt.

## 2022-11-30 NOTE — TELEPHONE ENCOUNTER
Please inform patient I rev'd her pap. Pap shows LSIL.  She is 26, her last 2 paps are ascus, HPV +    She needs a colposcopy

## 2023-01-17 ENCOUNTER — TELEPHONE (OUTPATIENT)
Dept: OBGYN CLINIC | Facility: CLINIC | Age: 28
End: 2023-01-17

## 2023-01-17 NOTE — TELEPHONE ENCOUNTER
Pt calling to schedule Colpo, discussed procedure and answered questions to pts satisfaction. Pt states she accidentally scheduled pelvic ultrasound thinking it was the Colpo for 2/20. Pt asked RN to cancel ultrasound and would like Colpo for that date. Assisted pt with appt w/SHELDON. Pt is on OCP's, informed of Ibuprofen prep and to call office if on cycle. Pt states understanding.

## 2023-01-17 NOTE — TELEPHONE ENCOUNTER
Patient need a nurse to call her she has questions to ask regarding the coloscopy. ...and would like to make an appointment for the procedure

## 2023-02-20 ENCOUNTER — OFFICE VISIT (OUTPATIENT)
Dept: OBGYN CLINIC | Facility: CLINIC | Age: 28
End: 2023-02-20

## 2023-02-20 VITALS
DIASTOLIC BLOOD PRESSURE: 104 MMHG | BODY MASS INDEX: 20 KG/M2 | HEART RATE: 73 BPM | SYSTOLIC BLOOD PRESSURE: 156 MMHG | WEIGHT: 118 LBS

## 2023-02-20 DIAGNOSIS — Z32.00 PREGNANCY EXAMINATION OR TEST, PREGNANCY UNCONFIRMED: Primary | ICD-10-CM

## 2023-02-20 DIAGNOSIS — R87.612 PAPANICOLAOU SMEAR OF CERVIX WITH LOW GRADE SQUAMOUS INTRAEPITHELIAL LESION (LGSIL): ICD-10-CM

## 2023-02-20 LAB — CONTROL LINE PRESENT WITH A CLEAR BACKGROUND (YES/NO): YES YES/NO

## 2023-02-20 PROCEDURE — 81025 URINE PREGNANCY TEST: CPT | Performed by: OBSTETRICS & GYNECOLOGY

## 2023-02-20 PROCEDURE — 3080F DIAST BP >= 90 MM HG: CPT | Performed by: OBSTETRICS & GYNECOLOGY

## 2023-02-20 PROCEDURE — 57454 BX/CURETT OF CERVIX W/SCOPE: CPT | Performed by: OBSTETRICS & GYNECOLOGY

## 2023-02-20 PROCEDURE — 3077F SYST BP >= 140 MM HG: CPT | Performed by: OBSTETRICS & GYNECOLOGY

## 2023-02-20 NOTE — PROCEDURES
Colpo w/Cx Biopsy and ECC    Pregnancy Results: negative from urine test     Consent signed. Procedure discussed with patient in detail including indication, risk, benefits, alternatives and complications. Pre-Procedure:  Cervix prepped with:  Acetic acid    Procedure:  Under satisfactory analgesia, the patient was prepped and draped in the dorsal lithotomy position. [place] speculum was placed in the vagina. Under colposcopic examination the transition zone was seen in entirety. Cervical biopsy performed with cervical biopsy punch at 6 o'clock. Endocervix was curetted using a Kervorkian curette. Silver nitrate applied  Specimen sent to pathology. Patient tolerated procedure well.       Findings:  Acetowhite epithelium    Impression:  Low-grade dysplasia

## 2023-03-21 NOTE — TELEPHONE ENCOUNTER
Pt reports right side pelvic pain x 2 wks. Pt reports the pain as stabbing and rates apin 5/10. Pt reports lmp 5/8/2020. Pt states she took 2 HPTs both were negative with the last one taken on 6/29/2020.  Pt reports taking OCPs and had not missed a pill and Low Dose Naltrexone Counseling- I discussed with the patient the potential risks and side effects of low dose naltrexone including but not limited to: more vivid dreams, headaches, nausea, vomiting, abdominal pain, fatigue, dizziness, and anxiety.

## 2024-01-29 ENCOUNTER — PATIENT MESSAGE (OUTPATIENT)
Dept: OBGYN CLINIC | Facility: CLINIC | Age: 29
End: 2024-01-29

## 2024-02-08 ENCOUNTER — PATIENT MESSAGE (OUTPATIENT)
Dept: FAMILY MEDICINE CLINIC | Facility: CLINIC | Age: 29
End: 2024-02-08

## 2024-02-09 NOTE — TELEPHONE ENCOUNTER
From: Kristal Santo  To: Lew West  Sent: 2/8/2024 2:28 PM CST  Subject: Non-Urgent Medical Question    Hi I have a cold sore and was wondering if I can get the medication to get rid of it or prevent it. I commonly get these if stress is high or I am about to be sick.

## 2024-02-09 NOTE — TELEPHONE ENCOUNTER
From  Mackenzie Holder RN To  Kristal Santo Sent and Delivered  2/9/2024 10:43 AM   Last Read in MyChart  2/9/2024 10:54 AM by Kristal Santo

## 2024-03-03 DIAGNOSIS — Z30.41 ORAL CONTRACEPTIVE PILL SURVEILLANCE: ICD-10-CM

## 2024-03-04 RX ORDER — NORETHINDRONE ACETATE AND ETHINYL ESTRADIOL 1.5-30(21)
1 KIT ORAL DAILY
Qty: 84 TABLET | Refills: 3 | OUTPATIENT
Start: 2024-03-04

## 2024-03-14 NOTE — TELEPHONE ENCOUNTER
Spoke with pt.  States she does want to schedule an appt with us but she is at work right now. She will schedule an appt through [facility] once she is home.

## 2024-03-21 ENCOUNTER — PATIENT MESSAGE (OUTPATIENT)
Dept: OBGYN CLINIC | Facility: CLINIC | Age: 29
End: 2024-03-21

## 2024-03-21 DIAGNOSIS — Z30.41 ORAL CONTRACEPTIVE PILL SURVEILLANCE: ICD-10-CM

## 2024-03-21 RX ORDER — NORETHINDRONE ACETATE AND ETHINYL ESTRADIOL 1.5-30(21)
1 KIT ORAL DAILY
Qty: 56 TABLET | Refills: 0 | Status: SHIPPED | OUTPATIENT
Start: 2024-03-21

## 2024-03-21 NOTE — TELEPHONE ENCOUNTER
11/18/2022 w/[facility]  11/18/2022 Pap-LSIL and HPV+  2/20/[id number]-VSY-1    Next annual on 4/17/2024 w/SHELDON. OCP sent per protocol to cover until seen for annual.

## 2024-03-21 NOTE — TELEPHONE ENCOUNTER
From: Kristal Santo  To: Jeniffer Buchanan  Sent: 3/21/2024 10:51 AM CDT  Subject: Other    Good morning,  My appointment is scheduled for April 17 but I need to renew my birth control so I am not off cycle. I am also going out of town. Can you do this for me? Thank you!

## 2024-05-20 ENCOUNTER — PATIENT MESSAGE (OUTPATIENT)
Dept: OBGYN CLINIC | Facility: CLINIC | Age: 29
End: 2024-05-20

## 2024-05-20 DIAGNOSIS — Z30.41 ORAL CONTRACEPTIVE PILL SURVEILLANCE: ICD-10-CM

## 2024-05-20 NOTE — TELEPHONE ENCOUNTER
From: Kristal Santo  To: Jeniffer Buchanan  Sent: 5/20/2024 9:31 AM CDT  Subject: Non-Urgent Medical Question    Good morning   I have an apt on the 30th and I just ran out of my birth control pack. I also was wondering if I can be notified if there is an earlier appointment I can attend. If not totally okay I will keep my appointment.   Thank you!

## 2024-05-20 NOTE — TELEPHONE ENCOUNTER
Requested Prescriptions     Pending Prescriptions Disp Refills    Norethin Ace-Eth Estrad-FE (JUNEL FE 1.5/30) 1.5-30 MG-MCG Oral Tab 56 tablet 0     Sig: Take 1 tablet by mouth daily.     Last annual 11/18/22  Last filled 3/21/24 x 2 mo  Pap UTD    Next annual 5/30/24. Patient out of meds. To Dr. Lynette bocanegra to refill x 1?

## 2024-05-21 RX ORDER — NORETHINDRONE ACETATE AND ETHINYL ESTRADIOL 1.5-30(21)
1 KIT ORAL DAILY
Qty: 56 TABLET | Refills: 0 | Status: SHIPPED | OUTPATIENT
Start: 2024-05-21

## 2024-07-11 ENCOUNTER — OFFICE VISIT (OUTPATIENT)
Dept: OBGYN CLINIC | Facility: CLINIC | Age: 29
End: 2024-07-11
Payer: COMMERCIAL

## 2024-07-11 VITALS
SYSTOLIC BLOOD PRESSURE: 132 MMHG | DIASTOLIC BLOOD PRESSURE: 89 MMHG | WEIGHT: 119 LBS | BODY MASS INDEX: 20 KG/M2 | HEART RATE: 71 BPM

## 2024-07-11 DIAGNOSIS — Z30.41 ORAL CONTRACEPTIVE PILL SURVEILLANCE: ICD-10-CM

## 2024-07-11 DIAGNOSIS — Z01.419 WELL WOMAN EXAM WITH ROUTINE GYNECOLOGICAL EXAM: Primary | ICD-10-CM

## 2024-07-11 DIAGNOSIS — Z12.4 SCREENING FOR CERVICAL CANCER: ICD-10-CM

## 2024-07-11 PROCEDURE — 3079F DIAST BP 80-89 MM HG: CPT | Performed by: NURSE PRACTITIONER

## 2024-07-11 PROCEDURE — 99395 PREV VISIT EST AGE 18-39: CPT | Performed by: NURSE PRACTITIONER

## 2024-07-11 PROCEDURE — 3075F SYST BP GE 130 - 139MM HG: CPT | Performed by: NURSE PRACTITIONER

## 2024-07-11 RX ORDER — VALACYCLOVIR HYDROCHLORIDE 1 G/1
TABLET, FILM COATED ORAL
COMMUNITY
Start: 2024-02-09

## 2024-07-11 RX ORDER — NORETHINDRONE ACETATE AND ETHINYL ESTRADIOL 1.5-30(21)
1 KIT ORAL DAILY
Qty: 90 TABLET | Refills: 3 | Status: SHIPPED | OUTPATIENT
Start: 2024-07-11

## 2024-07-11 NOTE — PROGRESS NOTES
[facility]    Obstetrics and Gynecology    Chief Complaint   Patient presents with    Gyn Exam     Annual, ocp refill       Kristal Santo is a 28 year old female  Patient's last menstrual period was 2024 (exact date). presenting for annual gynecology exam.  Due for pap.    Off ocps for a month. Happy with ocps. Desires to continue. No intercourse since last menstrual period. No pelvic pain. No abnormal discharge or odor. Sexually active with same partner. Declines sti testing.    colpo 2023 BROOKE 1  Pap 2022 LSIL    2021 ASCUS, HPV + (age 25)  2019 ascus, HPV + (age 23)    Contraception:ocps  Mammo: n/a    OBSTETRICS HISTORY:  OB History    Para Term  AB Living   0 0 0 0 0 0   SAB IAB Ectopic Multiple Live Births   0 0 0 0 0       GYNE HISTORY:  Menarche: 16 y/o (2024  5:43 PM)  Period Cycle (Days): 28 days (2024  5:43 PM)  Period Duration (Days): 5 days (2024  5:43 PM)  Period Flow: NORMAL (2024  5:43 PM)  Use of Birth Control (if yes, specify type): OCP (2024  5:43 PM)  Date When Birth Control Last Used: 7/10/24 OCP (2024  5:43 PM)  Pap Date: 22 (2024  5:43 PM)  Pap Result Notes: LSIL (2024  5:43 PM)  Follow Up Recommendation: Annual 22 EMB (2024  5:43 PM)      History   Sexual Activity    Sexual activity: Yes    Partners: Male    Birth control/ protection: OCP     Comment: none           Latest Ref Rng & Units 2022     3:45 PM 2021    11:06 AM 2019     2:22 PM   RECENT PAP RESULTS   Thinprep Pap Negative for intraepithelial lesion or malignancy Low grade squamous intraepithelial lesion (LSIL) HPV/Mild dysplasia/BROOKE I  Atypical squamous cells of undetermined significance (ASC-US)  Atypical squamous cells of undetermined significance (ASC-US)    HPV Negative  Positive  Positive          MEDICAL HISTORY:  Past Medical History:    History of kidney stones    Scoliosis     History reviewed. No  pertinent surgical history.    SOCIAL HISTORY:  Social History     Socioeconomic History    Marital status: Single     Spouse name: Not on file    Number of children: Not on file    Years of education: Not on file    Highest education level: Not on file   Occupational History    Not on file   Tobacco Use    Smoking status: Never    Smokeless tobacco: Never   Substance and Sexual Activity    Alcohol use: Yes     Comment: SOCIALLY    Drug use: No     Comment: none    Sexual activity: Yes     Partners: Male     Birth control/protection: OCP     Comment: none   Other Topics Concern    Not on file   Social History Narrative    Not on file     Social Determinants of Health     Financial Resource Strain: Not on file   Food Insecurity: Not on file   Transportation Needs: Not on file   Physical Activity: Not on file   Stress: Not on file   Social Connections: Not on file   Housing Stability: Not on file         Depression Screening (PHQ-2/PHQ-9): Over the LAST 2 WEEKS   Little interest or pleasure in doing things (over the last two weeks)?: Not at all    Feeling down, depressed, or hopeless (over the last two weeks)?: Not at all    PHQ-2 SCORE: 0           FAMILY HISTORY:  Family History   Problem Relation Age of Onset    Diabetes Maternal Grandmother     Heart Disorder Maternal Grandmother     Heart Disorder Maternal Grandfather     Cancer Paternal Grandmother         unknown       MEDICATIONS:    Current Outpatient Medications:     Norethin Ace-Eth Estrad-FE (JUNEL FE 1.5/30) 1.5-30 MG-MCG Oral Tab, Take 1 tablet by mouth daily., Disp: 90 tablet, Rfl: 3    valACYclovir 1 G Oral Tab, TAKE 2 TABLETS BY MOUTH TWICE DAILY FOR ONE DAY. 12 PILLS ARE FOR 3 SEPARATE TREATMENT COURSES. (Patient not taking: Reported on 7/11/2024), Disp: , Rfl:     ALLERGIES:  No Known Allergies      Review of Systems:  Constitutional:  Denies fatigue, night sweats, hot flashes  Eyes:  denies blurred or double vision  Cardiovascular:  denies chest  pain or palpitations  Respiratory:  denies shortness of breath  Gastrointestinal:  denies heartburn, abdominal pain, diarrhea or constipation  Genitourinary:  denies dysuria, incontinence, abnormal vaginal discharge, vaginal itching,   Musculoskeletal:  denies back pain   Skin/Breast:  Denies any breast pain, lumps, or discharge.   Neurological:  denies headaches, extremity weakness or numbness.  Psychiatric: denies depression or anxiety.  Endocrine:   denies excessive thirst or urination.  Heme/Lymph:  denies history of anemia, easy bruising or bleeding.      PHYSICAL EXAM:     Vitals:    07/11/24 1745   BP: 132/89   Pulse: 71   Weight: 119 lb (54 kg)       Body mass index is 20.43 kg/m².     Patient offered chaperone, patient declined    Constitutional: well developed, well nourished  Psychiatric:  Oriented to time, place, person and situation. Appropriate mood and affect  Head/Face: normocephalic  Neck/Thyroid: thyroid symmetric, no thyromegaly, no nodules, no adenopathy  Lymphatic:no abnormal supraclavicular or axillary adenopathy is noted  Breast: normal without palpable masses, tenderness, asymmetry, nipple discharge, nipple retraction or skin changes  Abdomen:  soft, nontender, nondistended, no masses  Skin/Hair: no unusual rashes or bruises  Extremities: no edema, no cyanosis    Pelvic Exam:  External Genitalia: normal appearance, hair distribution, and no lesions  Urethral Meatus:  normal in size, location, without lesions and prolapse  Bladder:  No fullness, masses or tenderness  Vagina:  Normal appearance without lesions, no abnormal discharge  Cervix:  Normal without tenderness on motion  Uterus: normal in size, contour, position, mobility, without tenderness  Adnexa: normal without masses or tenderness  Perineum: normal  Anus: no hemorroids     Assessment & Plan:    ICD-10-CM    1. Well woman exam with routine gynecological exam  Z01.419       2. Oral contraceptive pill surveillance  Z30.41 Norethin  Ace-Eth Estrad-FE (JUNEL FE 1.5/30) 1.5-30 MG-MCG Oral Tab      3. Screening for cervical cancer  Z12.4 ThinPrep PAP with HPV Reflex Request B     ThinPrep PAP with HPV Reflex Request B         Reviewed ASCCP guidelines with the patient   Pap done today  Contraception: Using ocps. Reviewed quick start vs.. starting with last menstrual period. Reviewed risks and benefits of oral contraceptives. Reviewed to call or go to ER if increased headaches, SOB, CP or leg pain with or without swelling.  Reviewed when to start OCPs, how to take OCPs, and when to use back up contraception  Patient verbalized understanding   --Encouraged condoms to prevent STD exposure  Breast Health:     Reviewed current guidelines with the patient and to start Mammograms at age 40  Reviewed monthly self breast exams with the patient   Discussed diet, exercise, MVIs with Ca/Vit D  Follow up in 1 yr for JANEL Cage    This note was prepared using Dragon Medical voice recognition dictation software. As a result errors may occur. When identified these errors have been corrected. While every attempt is made to correct errors during dictation discrepancies may still exist.

## 2024-07-17 ENCOUNTER — TELEPHONE (OUTPATIENT)
Dept: OBGYN CLINIC | Facility: CLINIC | Age: 29
End: 2024-07-17

## 2024-07-17 DIAGNOSIS — Z98.890 HISTORY OF COLPOSCOPY: Primary | ICD-10-CM

## 2024-07-17 LAB — HPV E6+E7 MRNA CVX QL NAA+PROBE: NEGATIVE

## 2024-07-17 NOTE — TELEPHONE ENCOUNTER
Barb in the lab states human papillomavirus can be added.  Order placed to use existing specimen.

## 2024-07-17 NOTE — TELEPHONE ENCOUNTER
----- Message from Summer Betancur sent at 7/17/2024  8:32 AM CDT -----  Normal pap. Can we add on human papillomavirus due to colpo in 2023?    Summer Betancur, APRN

## 2024-11-24 DIAGNOSIS — Z30.41 ORAL CONTRACEPTIVE PILL SURVEILLANCE: ICD-10-CM

## 2024-11-24 RX ORDER — NORETHINDRONE ACETATE AND ETHINYL ESTRADIOL 1.5-30(21)
1 KIT ORAL DAILY
Qty: 90 TABLET | Refills: 3 | Status: CANCELLED | OUTPATIENT
Start: 2024-11-24

## 2025-03-14 ENCOUNTER — HOSPITAL ENCOUNTER (EMERGENCY)
Facility: HOSPITAL | Age: 30
Discharge: HOME | End: 2025-03-14
Attending: EMERGENCY MEDICINE
Payer: COMMERCIAL

## 2025-03-14 ENCOUNTER — APPOINTMENT (OUTPATIENT)
Dept: CT IMAGING | Facility: HOSPITAL | Age: 30
End: 2025-03-14
Attending: EMERGENCY MEDICINE
Payer: COMMERCIAL

## 2025-03-14 VITALS
HEIGHT: 64 IN | DIASTOLIC BLOOD PRESSURE: 96 MMHG | WEIGHT: 115 LBS | OXYGEN SATURATION: 99 % | HEART RATE: 96 BPM | SYSTOLIC BLOOD PRESSURE: 134 MMHG | BODY MASS INDEX: 19.63 KG/M2 | TEMPERATURE: 99 F | RESPIRATION RATE: 18 BRPM

## 2025-03-14 DIAGNOSIS — K52.9 GASTROENTERITIS: Primary | ICD-10-CM

## 2025-03-14 LAB
ALBUMIN SERPL-MCNC: 4.4 G/DL (ref 3.2–4.8)
ALBUMIN/GLOB SERPL: 1.8 {RATIO} (ref 1–2)
ALP LIVER SERPL-CCNC: 30 U/L
ALT SERPL-CCNC: 12 U/L
ANION GAP SERPL CALC-SCNC: 7 MMOL/L (ref 0–18)
AST SERPL-CCNC: 18 U/L (ref ?–34)
B-HCG UR QL: NEGATIVE
BASOPHILS # BLD AUTO: 0.01 X10(3) UL (ref 0–0.2)
BASOPHILS NFR BLD AUTO: 0.2 %
BILIRUB SERPL-MCNC: 0.7 MG/DL (ref 0.3–1.2)
BILIRUB UR QL: NEGATIVE
BUN BLD-MCNC: 11 MG/DL (ref 9–23)
BUN/CREAT SERPL: 14.1 (ref 10–20)
CALCIUM BLD-MCNC: 8.1 MG/DL (ref 8.7–10.4)
CHLORIDE SERPL-SCNC: 106 MMOL/L (ref 98–112)
CLARITY UR: CLEAR
CO2 SERPL-SCNC: 26 MMOL/L (ref 21–32)
COLOR UR: YELLOW
CREAT BLD-MCNC: 0.78 MG/DL
DEPRECATED RDW RBC AUTO: 38 FL (ref 35.1–46.3)
EGFRCR SERPLBLD CKD-EPI 2021: 107 ML/MIN/1.73M2 (ref 60–?)
EOSINOPHIL # BLD AUTO: 0.01 X10(3) UL (ref 0–0.7)
EOSINOPHIL NFR BLD AUTO: 0.2 %
ERYTHROCYTE [DISTWIDTH] IN BLOOD BY AUTOMATED COUNT: 12.1 % (ref 11–15)
GLOBULIN PLAS-MCNC: 2.4 G/DL (ref 2–3.5)
GLUCOSE BLD-MCNC: 98 MG/DL (ref 70–99)
GLUCOSE UR-MCNC: NORMAL MG/DL
HCT VFR BLD AUTO: 40.3 %
HGB BLD-MCNC: 13.9 G/DL
IMM GRANULOCYTES # BLD AUTO: 0.01 X10(3) UL (ref 0–1)
IMM GRANULOCYTES NFR BLD: 0.2 %
KETONES UR-MCNC: 20 MG/DL
LEUKOCYTE ESTERASE UR QL STRIP.AUTO: NEGATIVE
LIPASE SERPL-CCNC: 29 U/L (ref 12–53)
LYMPHOCYTES # BLD AUTO: 0.79 X10(3) UL (ref 1–4)
LYMPHOCYTES NFR BLD AUTO: 15.3 %
MCH RBC QN AUTO: 29.6 PG (ref 26–34)
MCHC RBC AUTO-ENTMCNC: 34.5 G/DL (ref 31–37)
MCV RBC AUTO: 85.7 FL
MONOCYTES # BLD AUTO: 0.4 X10(3) UL (ref 0.1–1)
MONOCYTES NFR BLD AUTO: 7.7 %
NEUTROPHILS # BLD AUTO: 3.96 X10 (3) UL (ref 1.5–7.7)
NEUTROPHILS # BLD AUTO: 3.96 X10(3) UL (ref 1.5–7.7)
NEUTROPHILS NFR BLD AUTO: 76.4 %
NITRITE UR QL STRIP.AUTO: NEGATIVE
OSMOLALITY SERPL CALC.SUM OF ELEC: 287 MOSM/KG (ref 275–295)
PH UR: 6.5 [PH] (ref 5–8)
PLATELET # BLD AUTO: 205 10(3)UL (ref 150–450)
PLATELETS.RETICULATED NFR BLD AUTO: 2.4 % (ref 0–7)
POTASSIUM SERPL-SCNC: 3.4 MMOL/L (ref 3.5–5.1)
PROT SERPL-MCNC: 6.8 G/DL (ref 5.7–8.2)
PROT UR-MCNC: 50 MG/DL
RBC # BLD AUTO: 4.7 X10(6)UL
SODIUM SERPL-SCNC: 139 MMOL/L (ref 136–145)
SP GR UR STRIP: >1.03 (ref 1–1.03)
UROBILINOGEN UR STRIP-ACNC: 3
WBC # BLD AUTO: 5.2 X10(3) UL (ref 4–11)

## 2025-03-14 PROCEDURE — 80053 COMPREHEN METABOLIC PANEL: CPT | Performed by: EMERGENCY MEDICINE

## 2025-03-14 PROCEDURE — 81001 URINALYSIS AUTO W/SCOPE: CPT | Performed by: EMERGENCY MEDICINE

## 2025-03-14 PROCEDURE — 81025 URINE PREGNANCY TEST: CPT

## 2025-03-14 PROCEDURE — 96374 THER/PROPH/DIAG INJ IV PUSH: CPT

## 2025-03-14 PROCEDURE — 99285 EMERGENCY DEPT VISIT HI MDM: CPT

## 2025-03-14 PROCEDURE — 83690 ASSAY OF LIPASE: CPT | Performed by: EMERGENCY MEDICINE

## 2025-03-14 PROCEDURE — 74176 CT ABD & PELVIS W/O CONTRAST: CPT | Performed by: EMERGENCY MEDICINE

## 2025-03-14 PROCEDURE — 96375 TX/PRO/DX INJ NEW DRUG ADDON: CPT

## 2025-03-14 PROCEDURE — 85025 COMPLETE CBC W/AUTO DIFF WBC: CPT | Performed by: EMERGENCY MEDICINE

## 2025-03-14 PROCEDURE — 83690 ASSAY OF LIPASE: CPT

## 2025-03-14 PROCEDURE — 99284 EMERGENCY DEPT VISIT MOD MDM: CPT

## 2025-03-14 PROCEDURE — 93010 ELECTROCARDIOGRAM REPORT: CPT

## 2025-03-14 PROCEDURE — 85025 COMPLETE CBC W/AUTO DIFF WBC: CPT

## 2025-03-14 PROCEDURE — 96361 HYDRATE IV INFUSION ADD-ON: CPT

## 2025-03-14 PROCEDURE — 80053 COMPREHEN METABOLIC PANEL: CPT

## 2025-03-14 PROCEDURE — 93005 ELECTROCARDIOGRAM TRACING: CPT

## 2025-03-14 RX ORDER — ONDANSETRON 4 MG/1
4 TABLET, ORALLY DISINTEGRATING ORAL EVERY 4 HOURS PRN
Qty: 14 TABLET | Refills: 0 | Status: SHIPPED | OUTPATIENT
Start: 2025-03-14 | End: 2025-03-21

## 2025-03-14 RX ORDER — KETOROLAC TROMETHAMINE 10 MG/1
10 TABLET, FILM COATED ORAL EVERY 6 HOURS PRN
Qty: 14 TABLET | Refills: 0 | Status: SHIPPED | OUTPATIENT
Start: 2025-03-14 | End: 2025-03-21

## 2025-03-14 RX ORDER — KETOROLAC TROMETHAMINE 30 MG/ML
30 INJECTION, SOLUTION INTRAMUSCULAR; INTRAVENOUS ONCE
Status: COMPLETED | OUTPATIENT
Start: 2025-03-14 | End: 2025-03-14

## 2025-03-14 RX ORDER — ONDANSETRON 2 MG/ML
4 INJECTION INTRAMUSCULAR; INTRAVENOUS ONCE
Status: COMPLETED | OUTPATIENT
Start: 2025-03-14 | End: 2025-03-14

## 2025-03-14 NOTE — ED INITIAL ASSESSMENT (HPI)
PT to ED, steady gait, alert and orientated x 4.   PT reporting RUQ pain, diarrhea, and vomiting, CP x 1 day. Advised to come in by IC.

## 2025-03-14 NOTE — ED PROVIDER NOTES
Patient Seen in: [facility] Emergency Department    History     Chief Complaint   Patient presents with    Abdomen/Flank Pain    Nausea/Vomiting/Diarrhea       HPI    29-year-old female presents ER with complaint of right flank pain and right upper quadrant abdominal pain as well as nausea and vomiting with diarrhea since yesterday.  Patient also complained of chills.  Patient was sent from urgent care for further evaluation.  Patient states that she has not been able to keep any fluids down.  Patient does have a history of kidney stones and ovarian cysts.  Patient states her abdominal pain is currently 8 out of 10    History reviewed.   Past Medical History:    History of kidney stones    Ovarian cyst    Scoliosis       History reviewed. History reviewed. No pertinent surgical history.      Medications :  Prescriptions Prior to Admission[1]     Family History   Problem Relation Age of Onset    Diabetes Maternal Grandmother     Heart Disorder Maternal Grandmother     Heart Disorder Maternal Grandfather     Cancer Paternal Grandmother         unknown       Smoking Status:   Social History     Socioeconomic History    Marital status: Single   Tobacco Use    Smoking status: Never    Smokeless tobacco: Never   Substance and Sexual Activity    Alcohol use: Yes     Comment: SOCIALLY    Drug use: No     Comment: none    Sexual activity: Yes     Partners: Male     Birth control/protection: OCP     Comment: none       ROS  All pertinent positives for the review of systems are mentioned in the HPI  All other organ systems are reviewed and are negative.    Constitutional and vital signs reviewed.      Social History and Family History elements reviewed from today, pertinent positives to the presenting problem noted.    Physical Exam     ED Triage Vitals [03/14/25 1623]   /88   Pulse 106   Resp 20   Temp 99.2 °F (37.3 °C)   Temp src Oral   SpO2 98 %   O2 Device None (Room air)       All measures to prevent  infection transmission during my interaction with the patient were taken. The patient was already wearing a droplet mask on my arrival to the room. Personal protective equipment including droplet mask, eye protection, and gloves were worn throughout the duration of the exam.  Handwashing was performed prior to and after the exam.  Stethoscope and any equipment used during my examination was cleaned with super sani-cloth germicidal wipes following the exam.     Physical Exam  Vitals and nursing note reviewed.   Constitutional:       Appearance: She is well-developed.   HENT:      Head: Normocephalic and atraumatic.      Right Ear: External ear normal.      Left Ear: External ear normal.      Nose: Nose normal.   Eyes:      Conjunctiva/sclera: Conjunctivae normal.      Pupils: Pupils are equal, round, and reactive to light.   Cardiovascular:      Rate and Rhythm: Regular rhythm. Tachycardia present.      Heart sounds: Normal heart sounds.   Pulmonary:      Effort: Pulmonary effort is normal.      Breath sounds: Normal breath sounds.   Abdominal:      General: Bowel sounds are normal.      Palpations: Abdomen is soft.      Tenderness: There is abdominal tenderness in the right upper quadrant. There is no right CVA tenderness or left CVA tenderness.   Musculoskeletal:         General: Normal range of motion.      Cervical back: Normal range of motion and neck supple.   Skin:     General: Skin is warm and dry.   Neurological:      Mental Status: She is alert and oriented to person, place, and time.      Deep Tendon Reflexes: Reflexes are normal and symmetric.   Psychiatric:         Behavior: Behavior normal.         Thought Content: Thought content normal.         Judgment: Judgment normal.         ED Course        Labs Reviewed   COMP METABOLIC PANEL (14) - Abnormal; Notable for the following components:       Result Value    Potassium 3.4 (*)     Calcium, Total 8.1 (*)     Alkaline Phosphatase 30 (*)     All other  components within normal limits   CBC WITH DIFFERENTIAL WITH PLATELET - Abnormal; Notable for the following components:    Lymphocyte Absolute 0.79 (*)     All other components within normal limits   URINALYSIS WITH CULTURE REFLEX - Abnormal; Notable for the following components:    Spec Gravity >1.030 (*)     Ketones Urine 20 (*)     Blood Urine 2+ (*)     Protein Urine 50 (*)     Urobilinogen Urine 3 (*)     RBC Urine 3-5 (*)     Bacteria Urine Rare (*)     Squamous Epi. Cells Few (*)     All other components within normal limits   LIPASE - Normal   POCT PREGNANCY URINE - Normal     EKG    Rate, intervals and axes as noted on EKG Report.  Rate: 103  Rhythm: Sinus Rhythm  Reading:no  ST deviation, normal axis.             Imaging Results Available and Reviewed while in ED: CT ABDOMEN+PELVIS(CPT=74176)    Result Date: 3/14/2025  CONCLUSION:   No hydronephrosis or obstructing renal stones.  Unchanged nonobstructing 1-2 mm left lower pole renal stone.  Probable corpus luteum cyst of the left ovary.  Small hyperdense lesion of the right ovary, which can represent a hemorrhagic cyst.  Correlation can be made with pelvic ultrasound.      Dictated by (CST): Silvia Davies MD on 3/14/2025 at 7:18 PM     Finalized by (CST): Silvia Davies MD on 3/14/2025 at 7:23 PM         ED Medications Administered:   Medications   sodium chloride 0.9 % IV bolus 1,000 mL (1,000 mL Intravenous New Bag 3/14/25 1817)   ondansetron (Zofran) 4 MG/2ML injection 4 mg (4 mg Intravenous Given 3/14/25 1822)   ketorolac (Toradol) 30 MG/ML injection 30 mg (30 mg Intravenous Given 3/14/25 1841)         MDM     Vitals:    03/14/25 1622 03/14/25 1623   BP:  131/88   Pulse:  106   Resp:  20   Temp:  99.2 °F (37.3 °C)   TempSrc:  Oral   SpO2:  98%   Weight: 52.2 kg    Height: 162.6 cm (5' 4\")      *I personally reviewed and interpreted all ED vitals.  I also personally reviewed all labs and imaging if ordered    Pulse Ox: 98%, Room air, Normal      Monitor Interpretation:   sinus tachycardia    Differential Diagnosis/ Diagnostic Considerations: Norovirus, gastroenteritis, cholecystitis,    Medical Record Review: I personally reviewed available prior medical records for any recent pertinent discharge summaries, testing, and procedures and reviewed those reports.    Complicating Factors: The patient already has does not have any pertinent problems on file. to contribute to the complexity of this ED evaluation.    Medical Decision Making  29-year-old female presents ER with complaints of nausea vomiting diarrhea for the past 2 days.  Patient also complained of right upper quad abdominal pain.  Patient given 1 L of IV fluids and Zofran states she feels better and no longer nauseous.  Patient CT abdomen pelvis shows acute intra-abdominal process.  Patient's mother bedside made aware of the discharge plan disposition.  Patient given Zofran for nausea and Toradol for pain at home as well    Problems Addressed:  Gastroenteritis: acute illness or injury    Amount and/or Complexity of Data Reviewed  Independent Historian:      Details: Mother states that everyone in her household has had norovirus and similar symptoms in the past  Labs: ordered. Decision-making details documented in ED Course.    Risk  Prescription drug management.        Condition upon leaving the department: Stable    Disposition and Plan     Clinical Impression:  1. Gastroenteritis        Disposition:  Discharge    Follow-up:  Lew West DO  [address]  [address]  [facility] [address]  [phone]    Schedule an appointment as soon as possible for a visit  If symptoms worsen      Medications Prescribed:  Current Discharge Medication List        START taking these medications    Details   ondansetron 4 MG Oral Tablet Dispersible Take 1 tablet (4 mg total) by mouth every 4 (four) hours as needed for Nausea.  Qty: 14 tablet, Refills: 0      Ketorolac Tromethamine 10 MG Oral Tab Take 1  tablet (10 mg total) by mouth every 6 (six) hours as needed.  Qty: 14 tablet, Refills: 0                      [1] (Not in a hospital admission)

## 2025-03-16 LAB
ATRIAL RATE: 103 BPM
P AXIS: 78 DEGREES
P-R INTERVAL: 132 MS
Q-T INTERVAL: 328 MS
QRS DURATION: 80 MS
QTC CALCULATION (BEZET): 429 MS
R AXIS: 77 DEGREES
T AXIS: 23 DEGREES
VENTRICULAR RATE: 103 BPM

## 2025-08-21 ENCOUNTER — PATIENT MESSAGE (OUTPATIENT)
Dept: OBGYN CLINIC | Facility: CLINIC | Age: 30
End: 2025-08-21

## 2025-08-21 DIAGNOSIS — Z30.41 ORAL CONTRACEPTIVE PILL SURVEILLANCE: ICD-10-CM

## 2025-08-21 RX ORDER — NORETHINDRONE ACETATE AND ETHINYL ESTRADIOL 1.5-30(21)
1 KIT ORAL DAILY
Qty: 84 TABLET | Refills: 0 | Status: SHIPPED | OUTPATIENT
Start: 2025-08-21 | End: 2025-08-21

## 2025-08-21 RX ORDER — NORETHINDRONE ACETATE AND ETHINYL ESTRADIOL 1.5-30(21)
1 KIT ORAL DAILY
Qty: 84 TABLET | Refills: 0 | Status: SHIPPED | OUTPATIENT
Start: 2025-08-21

## (undated) NOTE — LETTER
AUTHORIZATION FOR SURGICAL OPERATION OR OTHER PROCEDURE    1. I hereby authorize Dr. Alberto Crenshaw, and [place] [employer] [facility] staff assigned to my case to perform the following operation and/or procedure at the [facility]:    _______________________________________________________________________________________________    COLPOSCOPY     _______________________________________________________________________________________________    2. My physician has explained the nature and purpose of the operation or other procedure, possible alternative methods of treatment, the risks involved, and the possibility of complication to me. I acknowledge that no guarantee has been made as to the result that may be obtained. 3.  I recognize that, during the course of this operation, or other procedure, unforseen conditions may necessitate additional or different procedure than those listed above. I, therefore, further authorize and request that the above named physician, his/her physician assistants or designees perform such procedures as are, in his/her professional opinion, necessary and desirable. 4.  Any tissue or organs removed in the operation or other procedure may be disposed of by and at the discretion of the [facility] and [facility]. 5.  I understand that in the event of a medical emergency, I will be transported by local paramedics to [facility] or other [facility] emergency department. 6.  I certify that I have read and fully understand the above consent to operation and/or other procedure. 7.  I acknowledge that my physician has explained sedation/analgesia administration to me including the risks and benefits. I consent to the administration of sedation/analgesia as may be necessary or desirable in the judgement of my physician. Witness signature: ___________________________________________________ Date:  ______/______/_____                    Time:  ________ A. M. P.M.       Patient Name:  ______________________________________________________  (please print)      Patient signature:  ___________________________________________________             Relationship to Patient:           []  Parent    Responsible person                          []  Spouse  In case of minor or                    [] Other  _____________   Incompetent name:  __________________________________________________                               (please print)      _____________      Responsible person  In case of minor or  Incompetent signature:  _______________________________________________    Statement of Physician  My signature below affirms that prior to the time of the procedure, I have explained to the patient and/or his/her guardian, the risks and benefits involved in the proposed treatment and any reasonable alternative to the proposed treatment. I have also explained the risks and benefits involved in the refusal of the proposed treatment and have answered the patient's questions.                         Date:  ______/______/_______  Provider                      Signature:  __________________________________________________________       Time:  ___________ A.M    P.M.

## (undated) NOTE — LETTER
10/31/2018              [address] [id number]         Dear Anish ,    This letter is to inform you that our office has made several attempts to reach you by phone without success.   We were attempting to contact y

## (undated) NOTE — MR AVS SNAPSHOT
After Visit Summary   4/26/2021    Shanel Mcfadden    MRN: [id number]           Visit Information     Date & Time  4/26/2021  9:40 AM Provider  Gregorio Anderson MD Department  [facility], [address] [place] [address],[address], [facility]/[employer] (Approximate) 4/26/2022      Future Appointments        Provider Department    5/7/2021 2:00 PM TARA Butler      Follow-up Instructions    Return in about 4 months (around 8/26/2021) for med follow-up.      Imaging Schedul $70*   [facility]  Monday – Friday  8:00 am – 8:00 pm   Saturday – Sunday  8:00 am – 4:00 pm    WALK-IN CARE  Primary Care Providers  Treatment for acute illness   or injury that are   non-life-threatening.   Also farhan

## (undated) NOTE — MR AVS SNAPSHOT
After Visit Summary   9/23/2019    Mauro Eastman    MRN: [id number]           Visit Information     Date & Time  9/23/2019  1:20 PM Provider  Sydnie Jaimes MD [address] [place] [address], [address] [place] [address],[address], [facility]/[employer]. 2. Click on the Activate your Account if you have an activation code in the box under the *New User? section. 3. Enter your [employer] Activation Code exactly as it appears below.  You will not need to use this code after you have completed the sign-up proce DO YOU KNOW WHERE TO GO? Injury & illness are never convenient. If you are dealing with a   non-emergency, consider your options before heading to an ER.          SAME DAY  APPOINTMENTS  Available at primary care offices      AFTER HOURS CARE  Rey Larson